# Patient Record
Sex: MALE | Race: WHITE | NOT HISPANIC OR LATINO | URBAN - METROPOLITAN AREA
[De-identification: names, ages, dates, MRNs, and addresses within clinical notes are randomized per-mention and may not be internally consistent; named-entity substitution may affect disease eponyms.]

---
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
## 2022-02-11 VITALS
WEIGHT: 192.2 LBS | HEART RATE: 69 BPM | HEIGHT: 72 IN | BODY MASS INDEX: 26.03 KG/M2 | SYSTOLIC BLOOD PRESSURE: 128 MMHG | TEMPERATURE: 98.1 F | DIASTOLIC BLOOD PRESSURE: 67 MMHG

## 2022-02-16 NOTE — PROGRESS NOTES
Patient:   JESI QUINTERO            MRN: [id number]            FIN: [id number]               Age:   17 years     Sex:  Male     :  2000   Associated Diagnoses:   Well adult exam   Author:   Morro Ritter MD      Visit Information      Date of Service: 10/06/2017 04:21 pm  Performing Location: [facility]  Encounter#: [id number]      Primary Care Provider (PCP):  NONE ,       Referring Provider:  Sue Olmos MD    NPI# [id number]      Chief Complaint   10/6/2017 4:34 PM CDT    Sports Px- [facility]     Routine Health Care Visit      History of Present Illness   Doing well no concerns [place]             The patient presents for a general exam.  The patient's general health status is described as good.  The patient's diet is described as balanced.        Review of Systems   Constitutional:  Negative.    Eye:  Negative.    Ear/Nose/Mouth/Throat:  Negative.    Respiratory:  Negative.    Cardiovascular:  Negative.    Gastrointestinal:  Negative.    Genitourinary:  Negative.    Hematology/Lymphatics:  Negative.    Endocrine:  Negative.    Immunologic:  Negative.    Musculoskeletal:  Negative.    Integumentary:  Negative.    Neurologic:  Negative.    Psychiatric:  Negative.    All other systems reviewed and negative      Health Status   Allergies:    Allergic Reactions (Selected)  No Known Medication Allergies   Medications:  (Selected)      Problem list:    No problem items selected or recorded.      Histories   Past Medical History:    No active or resolved past medical history items have been selected or recorded.   Family History:    No family history items have been selected or recorded.   Procedure history:    No active procedure history items have been selected or recorded.   Social History:             No active social history items have been recorded.      Physical Examination   Vital Signs   10/6/2017 4:34 PM CDT Temperature Tympanic 98.1 DegF    Peripheral Pulse Rate 69  bpm    HR Method Electronic    Systolic Blood Pressure 128 mmHg    Diastolic Blood Pressure 67 mmHg    Mean Arterial Pressure 87 mmHg    BP Method Electronic      Measurements from flowsheet : Measurements   10/6/2017 4:34 PM CDT Height Measured - Standard 72 in    Weight Measured - Standard 192.2 lb    BSA 2.1 m2    Body Mass Index 26.06 kg/m2    Body Mass Index Percentile 85.55      General:  Alert and oriented.    Eye:  Pupils are equal, round and reactive to light, Normal conjunctiva.    HENT:  Normocephalic, Tympanic membranes are clear, Oral mucosa is moist.    Neck:  Supple, Non-tender, No lymphadenopathy, No thyromegaly.    Respiratory:  Breath sounds are equal, Symmetrical chest wall expansion.         Respirations: Are within normal limits.         Pattern: Regular.         Breath sounds: Bilateral, Within normal limits.    Cardiovascular:  Normal rate, Regular rhythm, No murmur, Good pulses equal in all extremities, Normal peripheral perfusion, No edema.    Gastrointestinal:  Soft, Non-tender, Non-distended, Normal bowel sounds.    Musculoskeletal:  No tenderness, No swelling.    Integumentary:  Warm, Dry.    Neurologic:  No focal deficits.    Cognition and Speech:  Oriented, Speech clear and coherent.    Psychiatric:  Appropriate mood & affect, Normal judgment.       Health Maintenance      Recommendations     Pending (in the next year)        Due            Alcohol Misuse Screen (Male) due  10/06/17  and every 1  year(s)           Depression Screen (Male) due  10/06/17  and every 1  year(s)           HIV Screen (if sexually active) (Male) due  10/06/17  and every 1  year(s)           STD Counseling (if sexually active) (Male) due  10/06/17  and every 1  year(s)           Syphilis Screen (if sexually active) (Male) due  10/06/17  and every 1  year(s)           Well Child 2 yrs - 18 yrs due  10/06/17  and every 1  year(s)     Satisfied (in the past 1 year)        Satisfied            Body Mass Index Check  (Male) on  10/06/17.           High Blood Pressure Screen (Male) on  10/06/17.           Tobacco Use Screen (Male) on  10/06/17.          Impression and Plan   Diagnosis     Well adult exam ([id number]-AF Z00.00).     Course:  Progressing as expected.    Plan:  hcm reviewed.    Patient Instructions:       Counseled: Patient, Diet, Activity, Verbalized understanding.    Counseled:  Patient, Routine exercise and healthy weight maintenance discussed.    Patient Instructions:  Return to clinic in one year for next routine health visit, or sooner if problems or concerns.